# Patient Record
Sex: MALE | Race: WHITE | Employment: OTHER | URBAN - METROPOLITAN AREA
[De-identification: names, ages, dates, MRNs, and addresses within clinical notes are randomized per-mention and may not be internally consistent; named-entity substitution may affect disease eponyms.]

---

## 2018-10-10 ENCOUNTER — HOSPITAL ENCOUNTER (EMERGENCY)
Age: 23
Discharge: HOME | End: 2018-10-10
Attending: EMERGENCY MEDICINE
Payer: OTHER GOVERNMENT

## 2018-10-10 ENCOUNTER — APPOINTMENT (OUTPATIENT)
Dept: GENERAL RADIOLOGY | Age: 23
End: 2018-10-10
Attending: EMERGENCY MEDICINE
Payer: OTHER GOVERNMENT

## 2018-10-10 VITALS
TEMPERATURE: 98.1 F | DIASTOLIC BLOOD PRESSURE: 83 MMHG | BODY MASS INDEX: 25.33 KG/M2 | RESPIRATION RATE: 16 BRPM | WEIGHT: 187 LBS | HEART RATE: 96 BPM | OXYGEN SATURATION: 98 % | SYSTOLIC BLOOD PRESSURE: 140 MMHG | HEIGHT: 72 IN

## 2018-10-10 DIAGNOSIS — S83.005A PATELLAR DISLOCATION, LEFT, INITIAL ENCOUNTER: Primary | ICD-10-CM

## 2018-10-10 PROCEDURE — 99284 EMERGENCY DEPT VISIT MOD MDM: CPT

## 2018-10-10 PROCEDURE — 73564 X-RAY EXAM KNEE 4 OR MORE: CPT

## 2018-10-10 PROCEDURE — L1830 KO IMMOB CANVAS LONG PRE OTS: HCPCS

## 2018-10-10 PROCEDURE — 74011250637 HC RX REV CODE- 250/637: Performed by: EMERGENCY MEDICINE

## 2018-10-10 RX ORDER — IBUPROFEN 600 MG/1
600 TABLET ORAL
Status: COMPLETED | OUTPATIENT
Start: 2018-10-10 | End: 2018-10-10

## 2018-10-10 RX ORDER — NAPROXEN 500 MG/1
500 TABLET ORAL 2 TIMES DAILY WITH MEALS
Qty: 30 TAB | Refills: 0 | Status: SHIPPED | OUTPATIENT
Start: 2018-10-10 | End: 2018-10-25

## 2018-10-10 RX ORDER — HYDROCODONE BITARTRATE AND ACETAMINOPHEN 5; 325 MG/1; MG/1
1 TABLET ORAL
Status: COMPLETED | OUTPATIENT
Start: 2018-10-10 | End: 2018-10-10

## 2018-10-10 RX ORDER — ACETAMINOPHEN 500 MG
1000 TABLET ORAL
Qty: 50 TAB | Refills: 0 | Status: SHIPPED | OUTPATIENT
Start: 2018-10-10

## 2018-10-10 RX ADMIN — IBUPROFEN 600 MG: 600 TABLET ORAL at 19:59

## 2018-10-10 RX ADMIN — HYDROCODONE BITARTRATE AND ACETAMINOPHEN 1 TABLET: 5; 325 TABLET ORAL at 19:59

## 2018-10-10 NOTE — ED PROVIDER NOTES
HPI Comments: Osmar Taylor is a 21 y.o. 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 C/o severe pain, knee cap off to side. Denies other injury. No prior h/o similar issue, knee surgery or recent illness. Nothing given for pain. Constant throbbing The history is provided by the patient. History reviewed. No pertinent past medical history. History reviewed. No pertinent surgical history. History reviewed. No pertinent family history. Social History Social History  Marital status: SINGLE Spouse name: N/A  
 Number of children: N/A  
 Years of education: N/A Occupational History  Not on file. Social History Main Topics  Smoking status: Former Smoker Quit date: 9/10/2018  Smokeless tobacco: Never Used  Alcohol use Yes Comment: socially  Drug use: No  
 Sexual activity: Yes  
  Partners: Female Other Topics Concern  Not on file Social History Narrative  No narrative on file ALLERGIES: Review of patient's allergies indicates no known allergies. Review of Systems Constitutional: Negative for fever. HENT: Negative for sore throat. Respiratory: Negative for shortness of breath. Cardiovascular: Negative for chest pain. Gastrointestinal: Negative for abdominal pain. Genitourinary: Negative for difficulty urinating. Musculoskeletal: Positive for arthralgias, gait problem and joint swelling. Skin: Negative for rash and wound. Allergic/Immunologic: Negative for immunocompromised state. Neurological: Negative for numbness. Psychiatric/Behavioral: Positive for sleep disturbance. Vitals:  
 10/10/18 1950 BP: 140/83 Pulse: 96  
Resp: 16 Temp: 98.1 °F (36.7 °C) SpO2: 98% Weight: 84.8 kg (187 lb) Height: 6' (1.829 m) Physical Exam  
Constitutional: He is oriented to person, place, and time.   Non-toxic appearance. He does not appear ill. He appears distressed. HENT:  
Head: Normocephalic and atraumatic. Right Ear: External ear normal.  
Left Ear: External ear normal.  
Nose: Nose normal.  
Mouth/Throat: Oropharynx is clear and moist. No oropharyngeal exudate. Eyes: Conjunctivae are normal.  
Neck: Normal range of motion. Cardiovascular: Normal rate, regular rhythm, normal heart sounds and intact distal pulses. Pulmonary/Chest: Effort normal and breath sounds normal. No respiratory distress. Abdominal: Soft. Musculoskeletal: He exhibits no edema. Left knee: He exhibits decreased range of motion, swelling, deformity (patella laterally dislocated), abnormal alignment and abnormal patellar mobility. He exhibits no effusion, no ecchymosis, no laceration, no erythema, no LCL laxity, normal meniscus and no MCL laxity. Tenderness found. Medial joint line, lateral joint line and patellar tendon tenderness noted. No MCL and no LCL tenderness noted. Nl distal pulses, sensation lt During process of exam patella spontaneously relocated Neurological: He is alert and oriented to person, place, and time. Skin: Skin is warm and dry. He is not diaphoretic. Psychiatric: His behavior is normal.  
Nursing note and vitals reviewed. [facility] 
 
 
ED Course Procedures Vitals: 
Patient Vitals for the past 12 hrs: 
 Temp Pulse Resp BP SpO2  
10/10/18 1950 98.1 °F (36.7 °C) 96 16 140/83 98 % Medications ordered:  
Medications  
ibuprofen (MOTRIN) tablet 600 mg (600 mg Oral Given 10/10/18 1959) HYDROcodone-acetaminophen (NORCO) 5-325 mg per tablet 1 Tab (1 Tab Oral Given 10/10/18 1959) Lab findings: 
No results found for this or any previous visit (from the past 12 hour(s)). EKG interpretation by ED Physician: X-Ray, CT or other radiology findings or impressions: 
XR KNEE LT MIN 4 V    (Results Pending)  
nothing acute per my interp Progress notes, Consult notes or additional Procedure notes:  
Placed in knee imm with md sup, nv intact Doubt need for transfer, emergent consult Pt given cd I have discussed with patient and/or family/sig other the results, interpretation of any imaging if performed, suspected diagnosis and treatment plan to include instructions regarding the diagnoses listed to which understanding was expressed with all questions answered Reevaluation of patient:  
stable Disposition: 
Diagnosis: 1. Patellar dislocation, left, initial encounter Disposition: home Follow-up Information Follow up With Details Comments Contact Info Follow up with medical section [facility] tomorrow for duty disposition and orthopedic referral     
  
 
   
Patient's Medications Start Taking ACETAMINOPHEN (TYLENOL EXTRA STRENGTH) 500 MG TABLET    Take 2 Tabs by mouth every six (6) hours as needed for Pain. NAPROXEN (NAPROSYN) 500 MG TABLET    Take 1 Tab by mouth two (2) times daily (with meals) for 15 days. Continue Taking No medications on file These Medications have changed No medications on file Stop Taking No medications on file

## 2018-10-11 NOTE — ED NOTES
Purposeful rounding completed: 
 
Side rails up x 1:  YES Bed in low position and wheels locked: YES Call bell within reach: YES Comfort addressed: YES Toileting needs addressed: YES Plan of care reviewed/updated with patient and or family members: YES 
IV site assessed: N/A Pain assessed and addressed: YES, 1